# Patient Record
Sex: FEMALE | Race: OTHER | NOT HISPANIC OR LATINO
[De-identification: names, ages, dates, MRNs, and addresses within clinical notes are randomized per-mention and may not be internally consistent; named-entity substitution may affect disease eponyms.]

---

## 2019-01-27 ENCOUNTER — RECORD ABSTRACTING (OUTPATIENT)
Age: 59
End: 2019-01-27

## 2019-01-27 DIAGNOSIS — Z83.3 FAMILY HISTORY OF DIABETES MELLITUS: ICD-10-CM

## 2019-01-27 DIAGNOSIS — J98.01 ACUTE BRONCHOSPASM: ICD-10-CM

## 2019-01-27 DIAGNOSIS — Z78.9 OTHER SPECIFIED HEALTH STATUS: ICD-10-CM

## 2019-01-27 DIAGNOSIS — K21.9 GASTRO-ESOPHAGEAL REFLUX DISEASE W/OUT ESOPHAGITIS: ICD-10-CM

## 2019-01-27 DIAGNOSIS — Z87.19 PERSONAL HISTORY OF OTHER DISEASES OF THE DIGESTIVE SYSTEM: ICD-10-CM

## 2019-01-30 ENCOUNTER — APPOINTMENT (OUTPATIENT)
Dept: CARDIOLOGY | Facility: CLINIC | Age: 59
End: 2019-01-30
Payer: COMMERCIAL

## 2019-01-30 ENCOUNTER — NON-APPOINTMENT (OUTPATIENT)
Age: 59
End: 2019-01-30

## 2019-01-30 VITALS
HEART RATE: 92 BPM | DIASTOLIC BLOOD PRESSURE: 78 MMHG | WEIGHT: 135 LBS | HEIGHT: 60 IN | BODY MASS INDEX: 26.5 KG/M2 | SYSTOLIC BLOOD PRESSURE: 115 MMHG

## 2019-01-30 PROBLEM — Z87.19 HISTORY OF GASTROESOPHAGEAL REFLUX (GERD): Status: RESOLVED | Noted: 2019-01-30 | Resolved: 2019-01-30

## 2019-01-30 PROBLEM — Z78.9 NO FAMILY HISTORY OF CARDIOVASCULAR DISEASE: Status: ACTIVE | Noted: 2019-01-30

## 2019-01-30 PROBLEM — J98.01 BRONCHOSPASM: Status: RESOLVED | Noted: 2019-01-30 | Resolved: 2019-01-30

## 2019-01-30 PROCEDURE — 99244 OFF/OP CNSLTJ NEW/EST MOD 40: CPT

## 2019-01-30 PROCEDURE — 93000 ELECTROCARDIOGRAM COMPLETE: CPT

## 2019-01-30 NOTE — REASON FOR VISIT
[FreeTextEntry1] : Kenisha Vallecillo presents for cardiovascular consultation. She is seen at the patient and her primary care providers request.\par \par She is a 58-year-old female patient.\par \par Her primary care physician is Doctor Diaz in [place].\par \par Her problem list includes supraventricular tachycardia status post ablation in 2013, dyslipidemia, hyperglycemia.\par \par She has additional medical problems as noted.

## 2019-01-30 NOTE — HISTORY OF PRESENT ILLNESS
[FreeTextEntry1] : Since her last examination more than one year ago she reports no major events or hospitalizations. She has been active and exercising. 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 She reports she has had laboratories with her primary care physician. I reviewed the findings

## 2019-01-30 NOTE — PHYSICAL EXAM

## 2019-01-30 NOTE — DISCUSSION/SUMMARY
[FreeTextEntry1] : Supraventricular tachycardia.\par She remains with a durable control after ablation in 2013 by Doctor Yuliya.\par \par Dyslipidemia.\par Improving control with therapeutic blood cell treatment.\par \par Hyperglycemia.\par Improved control.\par \par Valve/echo findings. Insignificant\par \par Overweight.\par She is enthusiastic about working on her exercise and diet and I advised her.

## 2020-07-29 ENCOUNTER — APPOINTMENT (OUTPATIENT)
Dept: CARDIOLOGY | Facility: CLINIC | Age: 60
End: 2020-07-29
Payer: COMMERCIAL

## 2020-07-29 ENCOUNTER — NON-APPOINTMENT (OUTPATIENT)
Age: 60
End: 2020-07-29

## 2020-07-29 VITALS
DIASTOLIC BLOOD PRESSURE: 78 MMHG | BODY MASS INDEX: 27.29 KG/M2 | HEIGHT: 60 IN | SYSTOLIC BLOOD PRESSURE: 124 MMHG | WEIGHT: 139 LBS

## 2020-07-29 DIAGNOSIS — Z86.79 PERSONAL HISTORY OF OTHER DISEASES OF THE CIRCULATORY SYSTEM: ICD-10-CM

## 2020-07-29 PROCEDURE — 99214 OFFICE O/P EST MOD 30 MIN: CPT

## 2020-07-29 PROCEDURE — 93000 ELECTROCARDIOGRAM COMPLETE: CPT

## 2020-07-29 NOTE — HISTORY OF PRESENT ILLNESS
[FreeTextEntry1] : This 60 year-old female patient presents for cardiovascular evaluation.\par \par Her problem list is as noted above.\par \par Since her last examination in January 2019 she reports no major events or hospitalizations.  She has been active.  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  She has struggled with her weight a bit.  I encouraged her.\par \par There have been no acute symptoms of shortness of breath, chest discomfort, palpitation, lightheadedness.  Overall she is feeling well.\par \par She is seen at the patient and her primary care physician's request.

## 2020-07-29 NOTE — DISCUSSION/SUMMARY
[FreeTextEntry1] : Brief recommendations and follow-up: (see above for details)\par \par Patient's cardiovascular status is stable.\par Durable response to her ablation for paroxysmal supraventricular tachycardia.\par She needs attention to risk factor reduction, diet and exercise.  I recommended formal nutritional consultation.\par Next routine cardiology visit 1 year.

## 2020-07-29 NOTE — ASSESSMENT
[FreeTextEntry1] : EKG 7/29/2020.  Sinus rhythm.  Within normal limits.\par EKG 1/30/19. sinus rhythm. Within normal limits.

## 2020-07-29 NOTE — REASON FOR VISIT
[FreeTextEntry1] : Ms. DANIELLE LANDA has the following problem list:\par \par Supraventricular tachycardia/status post ablation 2013\par Dyslipidemia\par Hyperglycemia\par \par She has additional medical problems as noted\par \par Her primary care physician is Doctor Sergio Daiz in [place].\par

## 2021-04-28 ENCOUNTER — NON-APPOINTMENT (OUTPATIENT)
Age: 61
End: 2021-04-28

## 2021-04-28 ENCOUNTER — APPOINTMENT (OUTPATIENT)
Dept: INTERNAL MEDICINE | Facility: CLINIC | Age: 61
End: 2021-04-28
Payer: COMMERCIAL

## 2021-04-28 VITALS — SYSTOLIC BLOOD PRESSURE: 150 MMHG | DIASTOLIC BLOOD PRESSURE: 70 MMHG

## 2021-04-28 VITALS — BODY MASS INDEX: 25.13 KG/M2 | TEMPERATURE: 98 F | HEIGHT: 60 IN | WEIGHT: 128 LBS

## 2021-04-28 DIAGNOSIS — I47.1 SUPRAVENTRICULAR TACHYCARDIA: ICD-10-CM

## 2021-04-28 PROCEDURE — 99072 ADDL SUPL MATRL&STAF TM PHE: CPT

## 2021-04-28 PROCEDURE — 99203 OFFICE O/P NEW LOW 30 MIN: CPT

## 2021-04-28 NOTE — HISTORY OF PRESENT ILLNESS
[FreeTextEntry1] : establish care [de-identified] : Patient is a 60F with thyroid nodule, allergy rhinitis with bronchospasm (advair daily), SVT s/p ablation, right 5th digit degenerative cysts\par  (7/2020), pre-diabetes, HLD presents today to establish care\par Needs all new doctors due to insurance, was in the process of planning right thyroidectomy when drs dropped her insurance. \par \par \par

## 2021-04-28 NOTE — PHYSICAL EXAM
[Normal Sclera/Conjunctiva] : normal sclera/conjunctiva [Normal Outer Ear/Nose] : the outer ears and nose were normal in appearance [No Edema] : there was no peripheral edema [No Rash] : no rash [Normal] : affect was normal and insight and judgment were intact [de-identified] : right thyroid nodule

## 2021-04-28 NOTE — HEALTH RISK ASSESSMENT
[Never (0 pts)] : Never (0 points) [No] : In the past 12 months have you used drugs other than those required for medical reasons? No [No falls in past year] : Patient reported no falls in the past year [0] : 2) Feeling down, depressed, or hopeless: Not at all (0) [HIV Test offered] : HIV Test offered [Hepatitis C test offered] : Hepatitis C test offered [# of Members in Household ___] :  household currently consist of [unfilled] member(s) [Employed] : employed [Single] : single [# Of Children ___] : has [unfilled] children [Feels Safe at Home] : Feels safe at home [Reports normal functional visual acuity (ie: able to read med bottle)] : Reports normal functional visual acuity [Smoke Detector] : smoke detector [Carbon Monoxide Detector] : carbon monoxide detector [Seat Belt] :  uses seat belt [Sunscreen] : uses sunscreen [Patient/Caregiver not ready to engage] : Patient/Caregiver not ready to engage [] : No [de-identified] : walking  [de-identified] : well balanced  [[id number]] : 0 [Reports changes in hearing] : Reports no changes in hearing [Reports changes in vision] : Reports no changes in vision [Reports changes in dental health] : Reports no changes in dental health [Guns at Home] : no guns at home [Safety elements used in home] : no safety elements used in home [Travel to Developing Areas] : does not  travel to developing areas [Caregiver Concerns] : does not have caregiver concerns [MammogramDate] : 2017 [MammogramComments] : dorantes [PapSmearDate] : never [BoneDensityDate] : never [ColonoscopyDate] : never [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

## 2021-06-01 ENCOUNTER — APPOINTMENT (OUTPATIENT)
Dept: SURGERY | Facility: CLINIC | Age: 61
End: 2021-06-01
Payer: COMMERCIAL

## 2021-06-01 VITALS
DIASTOLIC BLOOD PRESSURE: 81 MMHG | SYSTOLIC BLOOD PRESSURE: 131 MMHG | WEIGHT: 132 LBS | HEIGHT: 59 IN | HEART RATE: 76 BPM | BODY MASS INDEX: 26.61 KG/M2

## 2021-06-01 PROCEDURE — 99072 ADDL SUPL MATRL&STAF TM PHE: CPT

## 2021-06-01 PROCEDURE — 99204 OFFICE O/P NEW MOD 45 MIN: CPT

## 2021-06-05 NOTE — HISTORY OF PRESENT ILLNESS
[de-identified] : Pt c/o Thyroid nodules.   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  denies dysphagia, hoarseness, SOB \par sonogram:  Right 3.2 cm nodule, FNA (CBL) nondiagnostic, Right 1.5 cm nodule, FNA  (CBL) FLUS,  HRAS positive\par TSH 2.39\par I have reviewed all old and new data and available images.\par

## 2021-06-05 NOTE — CONSULT LETTER
[Dear  ___] : Dear  [unfilled], [Consult Letter:] : I had the pleasure of evaluating your patient, [unfilled]. [Please see my note below.] : Please see my note below. [Consult Closing:] : Thank you very much for allowing me to participate in the care of this patient.  If you have any questions, please do not hesitate to contact me. [Sincerely,] : Sincerely, [FreeTextEntry2] : Dr. Trish Deutsch [FreeTextEntry3] : Moody Stringer MD, FACS\par System Director, Endocrine Surgery\par [facility]\par Assistant Clinical Professor of Surgery\par [facility] [employer] [facility]

## 2021-06-05 NOTE — ASSESSMENT
[FreeTextEntry1] : lengthy discussion regarding options for management including active surveillance  vs thyroid lobectomy with possible paratracheal node dissection, with or without frozen section vs total thyroidectomy with possible paratracheal node dissection.  risks, benefits and alternatives discussed at length.  I have discussed with the patient the anatomy of the area, the pathophysiology of the disease process and the rationale for surgery.  The attendant risks, possible complications and expected postoperative course have been discussed in detail.  I have given the patient the opportunity to ask questions, and all questions have been answered to the patient's satisfaction, and she wishes to proceed with lobectomy, possible total thyroidectomy. to be scheduled ambulatory at [facility]

## 2021-06-05 NOTE — PHYSICAL EXAM
[de-identified] : 2.5 cm right thyroid nodule, well circumscribed and mobile [Laryngoscopy Performed] : laryngoscopy was performed, see procedure section for findings [Midline] : located in midline position [Normal] : orientation to person, place, and time: normal [de-identified] : indirect  laryngoscopy shows normal vocal cord mobility bilaterally with no lesions noted

## 2021-06-10 ENCOUNTER — RESULT REVIEW (OUTPATIENT)
Age: 61
End: 2021-06-10

## 2021-07-20 ENCOUNTER — NON-APPOINTMENT (OUTPATIENT)
Age: 61
End: 2021-07-20

## 2021-07-21 ENCOUNTER — OUTPATIENT (OUTPATIENT)
Dept: OUTPATIENT SERVICES | Facility: HOSPITAL | Age: 61
LOS: 1 days | End: 2021-07-21

## 2021-07-21 ENCOUNTER — APPOINTMENT (OUTPATIENT)
Dept: CARDIOLOGY | Facility: CLINIC | Age: 61
End: 2021-07-21
Payer: COMMERCIAL

## 2021-07-21 ENCOUNTER — NON-APPOINTMENT (OUTPATIENT)
Age: 61
End: 2021-07-21

## 2021-07-21 VITALS
HEIGHT: 57 IN | DIASTOLIC BLOOD PRESSURE: 80 MMHG | HEART RATE: 68 BPM | WEIGHT: 130.07 LBS | RESPIRATION RATE: 18 BRPM | OXYGEN SATURATION: 98 % | TEMPERATURE: 97 F | SYSTOLIC BLOOD PRESSURE: 140 MMHG

## 2021-07-21 VITALS — HEART RATE: 86 BPM | SYSTOLIC BLOOD PRESSURE: 120 MMHG | DIASTOLIC BLOOD PRESSURE: 72 MMHG

## 2021-07-21 VITALS — WEIGHT: 130 LBS | HEIGHT: 59 IN | BODY MASS INDEX: 26.21 KG/M2

## 2021-07-21 DIAGNOSIS — Z98.890 OTHER SPECIFIED POSTPROCEDURAL STATES: Chronic | ICD-10-CM

## 2021-07-21 DIAGNOSIS — E04.1 NONTOXIC SINGLE THYROID NODULE: ICD-10-CM

## 2021-07-21 DIAGNOSIS — E04.2 NONTOXIC MULTINODULAR GOITER: ICD-10-CM

## 2021-07-21 LAB
A1C WITH ESTIMATED AVERAGE GLUCOSE RESULT: 5.5 % — SIGNIFICANT CHANGE UP (ref 4–5.6)
ALBUMIN SERPL ELPH-MCNC: 4.6 G/DL — SIGNIFICANT CHANGE UP (ref 3.3–5)
ALP SERPL-CCNC: 71 U/L — SIGNIFICANT CHANGE UP (ref 40–120)
ALT FLD-CCNC: 31 U/L — SIGNIFICANT CHANGE UP (ref 4–33)
ANION GAP SERPL CALC-SCNC: 14 MMOL/L — SIGNIFICANT CHANGE UP (ref 7–14)
AST SERPL-CCNC: 22 U/L — SIGNIFICANT CHANGE UP (ref 4–32)
BILIRUB SERPL-MCNC: 0.4 MG/DL — SIGNIFICANT CHANGE UP (ref 0.2–1.2)
BLD GP AB SCN SERPL QL: NEGATIVE — SIGNIFICANT CHANGE UP
BUN SERPL-MCNC: 16 MG/DL — SIGNIFICANT CHANGE UP (ref 7–23)
CALCIUM SERPL-MCNC: 9.6 MG/DL — SIGNIFICANT CHANGE UP (ref 8.4–10.5)
CHLORIDE SERPL-SCNC: 105 MMOL/L — SIGNIFICANT CHANGE UP (ref 98–107)
CO2 SERPL-SCNC: 23 MMOL/L — SIGNIFICANT CHANGE UP (ref 22–31)
CREAT SERPL-MCNC: 0.69 MG/DL — SIGNIFICANT CHANGE UP (ref 0.5–1.3)
ESTIMATED AVERAGE GLUCOSE: 111 — SIGNIFICANT CHANGE UP
GLUCOSE SERPL-MCNC: 77 MG/DL — SIGNIFICANT CHANGE UP (ref 70–99)
HCT VFR BLD CALC: 44.7 % — SIGNIFICANT CHANGE UP (ref 34.5–45)
HGB BLD-MCNC: 14.1 G/DL — SIGNIFICANT CHANGE UP (ref 11.5–15.5)
MCHC RBC-ENTMCNC: 28.6 PG — SIGNIFICANT CHANGE UP (ref 27–34)
MCHC RBC-ENTMCNC: 31.5 GM/DL — LOW (ref 32–36)
MCV RBC AUTO: 90.7 FL — SIGNIFICANT CHANGE UP (ref 80–100)
NRBC # BLD: 0 /100 WBCS — SIGNIFICANT CHANGE UP
NRBC # FLD: 0 K/UL — SIGNIFICANT CHANGE UP
PLATELET # BLD AUTO: 187 K/UL — SIGNIFICANT CHANGE UP (ref 150–400)
POTASSIUM SERPL-MCNC: 4.1 MMOL/L — SIGNIFICANT CHANGE UP (ref 3.5–5.3)
POTASSIUM SERPL-SCNC: 4.1 MMOL/L — SIGNIFICANT CHANGE UP (ref 3.5–5.3)
PROT SERPL-MCNC: 7.9 G/DL — SIGNIFICANT CHANGE UP (ref 6–8.3)
RBC # BLD: 4.93 M/UL — SIGNIFICANT CHANGE UP (ref 3.8–5.2)
RBC # FLD: 13 % — SIGNIFICANT CHANGE UP (ref 10.3–14.5)
RH IG SCN BLD-IMP: POSITIVE — SIGNIFICANT CHANGE UP
SODIUM SERPL-SCNC: 142 MMOL/L — SIGNIFICANT CHANGE UP (ref 135–145)
WBC # BLD: 4.19 K/UL — SIGNIFICANT CHANGE UP (ref 3.8–10.5)
WBC # FLD AUTO: 4.19 K/UL — SIGNIFICANT CHANGE UP (ref 3.8–10.5)

## 2021-07-21 PROCEDURE — 93000 ELECTROCARDIOGRAM COMPLETE: CPT | Mod: NC

## 2021-07-21 PROCEDURE — 99213 OFFICE O/P EST LOW 20 MIN: CPT

## 2021-07-21 PROCEDURE — 99072 ADDL SUPL MATRL&STAF TM PHE: CPT

## 2021-07-21 RX ORDER — SODIUM CHLORIDE 9 MG/ML
1000 INJECTION, SOLUTION INTRAVENOUS
Refills: 0 | Status: DISCONTINUED | OUTPATIENT
Start: 2021-08-02 | End: 2021-08-16

## 2021-07-21 NOTE — DISCUSSION/SUMMARY
[FreeTextEntry1] : Brief recommendations and follow-up: (see above for details)\par \par The patient is an appropriate candidate for her upcoming thyroid surgery as noted.\par Continue current routine.  No special intervention required.\par We will move back her August appointment 1 year from now.\par I advised routine laboratories including a lipid profile with her primary care physician.

## 2021-07-21 NOTE — CARDIOLOGY SUMMARY
[de-identified] : Holter 05/2012 sinus rhythm. Very rare APC.\par  [de-identified] : Stress Test 03/2013 no ischemia. 7 minutes. Anshul stage III. 8.1 METs. 97%. Diastolic  hypertension.\par  [de-identified] : Echo 02/2013 normal LV systolic and diastolic function, wall thickness, and chamber  size. EF 75%. Trivial anterior/posterior effusion. Normal valves. Trivial MR/TR.\par

## 2021-07-21 NOTE — HISTORY OF PRESENT ILLNESS
[FreeTextEntry1] : This 61 year-old female patient presents for cardiovascular evaluation.\par \par Her problem list is as noted above.\par \par Preoperative cardiovascular consultation.  She has been diagnosed with a right thyroid nodule.  Notes are reviewed.  She is anticipating right lobectomy and possible total thyroidectomy.\par \par Since her last evaluation approximately 1 year ago she reports no other major events or hospitalizations.  She is active and exercising.  There have been no acute symptoms of shortness of breath, chest discomfort, palpitation, lightheadedness, fainting.\par \par She has a new primary care physician as noted.\par \par She is seen at the primary care physician, and surgeon's request for preoperative consultation.\par \par

## 2021-07-21 NOTE — REASON FOR VISIT
[FreeTextEntry1] : Ms. DANIELLE LANDA has the following problem list:\par \par Supraventricular tachycardia/status post ablation 2013\par Dyslipidemia\par Prediabetes\par \par She has additional medical problems as noted\par \par Her primary care physician is Doctor Deutsch.\par

## 2021-07-21 NOTE — H&P PST ADULT - NSICDXFAMILYHX_GEN_ALL_CORE_FT
FAMILY HISTORY:  Father  Still living? No  FH: diabetes mellitus, Age at diagnosis: Age Unknown    Mother  Still living? No  FH: diabetes mellitus, Age at diagnosis: Age Unknown  FH: kidney disease, Age at diagnosis: Age Unknown    Sibling  Still living? Yes, Estimated age: Age Unknown  FH: diabetes mellitus, Age at diagnosis: Age Unknown

## 2021-07-21 NOTE — H&P PST ADULT - NSICDXPASTMEDICALHX_GEN_ALL_CORE_FT
PAST MEDICAL HISTORY:  Bronchospasm     Seasonal allergies     SVT (supraventricular tachycardia)      PAST MEDICAL HISTORY:  Bronchospasm     Multiple thyroid nodules     Obese     Seasonal allergies     SVT (supraventricular tachycardia)

## 2021-07-21 NOTE — ASSESSMENT
[FreeTextEntry1] : EKG 7/21/2021.  Sinus rhythm, within normal limits.\par EKG 7/29/2020.  Sinus rhythm.  Within normal limits.\par EKG 1/30/19. sinus rhythm. Within normal limits.

## 2021-07-21 NOTE — REVIEW OF SYSTEMS
[Negative] : Heme/Lymph [FreeTextEntry4] : Thyroid nodule, anticipating surgery. [FreeTextEntry5] : See HPI. [FreeTextEntry6] : History of asthma. [FreeTextEntry9] : Relatively mild arthritis. [de-identified] : Removal of mucoid cyst from right 5th digit.

## 2021-07-21 NOTE — H&P PST ADULT - NSICDXPASTSURGICALHX_GEN_ALL_CORE_FT
PAST SURGICAL HISTORY:  H/O excision of ganglion cyst right fifth finger-8/1990    H/O hemorrhoidectomy 1990    History of prior ablation treatment 1990's for SVT    History of pterygium excision right-1987

## 2021-07-21 NOTE — H&P PST ADULT - NSICDXPROBLEM_GEN_ALL_CORE_FT
PROBLEM DIAGNOSES  Problem: Multiple thyroid nodules  Assessment and Plan: Pt. is scheduled for a right thyroid lobectomy...possible paratracheal node dissection 8/2/21.  Pt. verbalized understanding of instructions and that Chlorhexidine is for external use.  Pt. had cardiac clearance earlier today, 7/21/21.

## 2021-07-26 PROBLEM — E04.2 NONTOXIC MULTINODULAR GOITER: Chronic | Status: ACTIVE | Noted: 2021-07-21

## 2021-07-26 PROBLEM — J98.01 ACUTE BRONCHOSPASM: Chronic | Status: ACTIVE | Noted: 2021-07-21

## 2021-07-26 PROBLEM — I47.1 SUPRAVENTRICULAR TACHYCARDIA: Chronic | Status: ACTIVE | Noted: 2021-07-21

## 2021-07-26 PROBLEM — J30.2 OTHER SEASONAL ALLERGIC RHINITIS: Chronic | Status: ACTIVE | Noted: 2021-07-21

## 2021-07-26 PROBLEM — E66.9 OBESITY, UNSPECIFIED: Chronic | Status: ACTIVE | Noted: 2021-07-21

## 2021-07-30 NOTE — ASU PATIENT PROFILE, ADULT - PMH
Bronchospasm    Multiple thyroid nodules    Obese    Seasonal allergies    SVT (supraventricular tachycardia)

## 2021-07-30 NOTE — ASU PATIENT PROFILE, ADULT - PSH
H/O excision of ganglion cyst  right fifth finger-8/1990  H/O hemorrhoidectomy  1990  History of prior ablation treatment  1990's for SVT  History of pterygium excision  right-1987

## 2021-08-01 ENCOUNTER — TRANSCRIPTION ENCOUNTER (OUTPATIENT)
Age: 61
End: 2021-08-01

## 2021-08-02 ENCOUNTER — OUTPATIENT (OUTPATIENT)
Dept: OUTPATIENT SERVICES | Facility: HOSPITAL | Age: 61
LOS: 1 days | Discharge: HOME | End: 2021-08-02
Payer: COMMERCIAL

## 2021-08-02 ENCOUNTER — APPOINTMENT (OUTPATIENT)
Dept: SURGERY | Facility: HOSPITAL | Age: 61
End: 2021-08-02

## 2021-08-02 ENCOUNTER — RESULT REVIEW (OUTPATIENT)
Age: 61
End: 2021-08-02

## 2021-08-02 VITALS
TEMPERATURE: 98 F | HEIGHT: 57 IN | OXYGEN SATURATION: 100 % | HEART RATE: 85 BPM | DIASTOLIC BLOOD PRESSURE: 82 MMHG | RESPIRATION RATE: 16 BRPM | SYSTOLIC BLOOD PRESSURE: 129 MMHG | WEIGHT: 130.07 LBS

## 2021-08-02 VITALS
RESPIRATION RATE: 17 BRPM | HEART RATE: 104 BPM | SYSTOLIC BLOOD PRESSURE: 123 MMHG | OXYGEN SATURATION: 97 % | DIASTOLIC BLOOD PRESSURE: 78 MMHG

## 2021-08-02 DIAGNOSIS — Z98.890 OTHER SPECIFIED POSTPROCEDURAL STATES: Chronic | ICD-10-CM

## 2021-08-02 DIAGNOSIS — E04.1 NONTOXIC SINGLE THYROID NODULE: ICD-10-CM

## 2021-08-02 PROCEDURE — 60252 REMOVAL OF THYROID: CPT

## 2021-08-02 PROCEDURE — 13132 CMPLX RPR F/C/C/M/N/AX/G/H/F: CPT | Mod: 59

## 2021-08-02 PROCEDURE — 88307 TISSUE EXAM BY PATHOLOGIST: CPT | Mod: 26

## 2021-08-02 RX ORDER — FLUTICASONE PROPIONATE 50 MCG
1 SPRAY, SUSPENSION NASAL
Qty: 0 | Refills: 0 | DISCHARGE

## 2021-08-02 RX ORDER — OXYCODONE HYDROCHLORIDE 5 MG/1
5 TABLET ORAL ONCE
Refills: 0 | Status: DISCONTINUED | OUTPATIENT
Start: 2021-08-02 | End: 2021-08-02

## 2021-08-02 RX ORDER — ACETAMINOPHEN 500 MG
650 TABLET ORAL EVERY 6 HOURS
Refills: 0 | Status: DISCONTINUED | OUTPATIENT
Start: 2021-08-02 | End: 2021-08-16

## 2021-08-02 RX ORDER — FENTANYL CITRATE 50 UG/ML
25 INJECTION INTRAVENOUS
Refills: 0 | Status: DISCONTINUED | OUTPATIENT
Start: 2021-08-02 | End: 2021-08-02

## 2021-08-02 RX ORDER — OXYCODONE AND ACETAMINOPHEN 5; 325 MG/1; MG/1
1 TABLET ORAL EVERY 6 HOURS
Refills: 0 | Status: DISCONTINUED | OUTPATIENT
Start: 2021-08-02 | End: 2021-08-02

## 2021-08-02 RX ORDER — BENZOCAINE AND MENTHOL 5; 1 G/100ML; G/100ML
1 LIQUID ORAL
Refills: 0 | Status: DISCONTINUED | OUTPATIENT
Start: 2021-08-02 | End: 2021-08-16

## 2021-08-02 RX ORDER — AZELASTINE 137 UG/1
2 SPRAY, METERED NASAL
Qty: 0 | Refills: 0 | DISCHARGE

## 2021-08-02 RX ORDER — FAMOTIDINE 10 MG/ML
1 INJECTION INTRAVENOUS
Qty: 0 | Refills: 0 | DISCHARGE

## 2021-08-02 RX ORDER — FLUTICASONE PROPIONATE AND SALMETEROL 50; 250 UG/1; UG/1
1 POWDER ORAL; RESPIRATORY (INHALATION)
Qty: 0 | Refills: 0 | DISCHARGE

## 2021-08-02 RX ORDER — ONDANSETRON 8 MG/1
4 TABLET, FILM COATED ORAL ONCE
Refills: 0 | Status: DISCONTINUED | OUTPATIENT
Start: 2021-08-02 | End: 2021-08-16

## 2021-08-02 RX ORDER — ACETAMINOPHEN 500 MG
2 TABLET ORAL
Qty: 0 | Refills: 0 | DISCHARGE
Start: 2021-08-02

## 2021-08-02 RX ADMIN — SODIUM CHLORIDE 50 MILLILITER(S): 9 INJECTION, SOLUTION INTRAVENOUS at 09:00

## 2021-08-02 NOTE — ASU DISCHARGE PLAN (ADULT/PEDIATRIC) - DIET/FLUID RESTRICTION
Progress slowly
Abdomen soft, non-tender and non-distended, no rebound, no guarding and no masses. no hepatosplenomegaly.

## 2021-08-02 NOTE — ASU DISCHARGE PLAN (ADULT/PEDIATRIC) - NURSING INSTRUCTIONS
You were given intravenous TYLENOL for pain management at  8am. Please DO NOT take any products containing TYLENOL or ACETAMINOPHEN, such as VICODIN, PERCOCET, EXCEDRIN, and any over-the-counter cold medication for the next 6 hours (until _2pm). DO NOT TAKE MORE THAN 3000 MG OF TYLENOL in a 24 hour period.

## 2021-08-02 NOTE — ASU DISCHARGE PLAN (ADULT/PEDIATRIC) - CARE PROVIDER_API CALL
Moody Stringer)  Plastic Surgery  [address] [place] [address] [address]  [place] [address]  Phone: [phone]  Fax: [phone]  Follow Up Time:

## 2021-08-02 NOTE — BRIEF OPERATIVE NOTE - OPERATION/FINDINGS
Right partial thyroidectomy of lobule was excised with isthmusectomy. Remaining isthmus/thyroid oversewn and hemostasis achieved with ties, clips, and electrocautery. Inferior paratracheal lymph nodes isolated and excised with no intraoperative signs of disease. Drain sutured in place and strap muscles, platysma, and overlying skin reapproximated.

## 2021-08-02 NOTE — BRIEF OPERATIVE NOTE - NSICDXBRIEFPROCEDURE_GEN_ALL_CORE_FT
PROCEDURES:  Partial thyroidectomy with isthmusectomy if indicated 02-Aug-2021 09:08:04  Juan Carlos Woodward

## 2021-08-03 ENCOUNTER — APPOINTMENT (OUTPATIENT)
Dept: SURGERY | Facility: CLINIC | Age: 61
End: 2021-08-03
Payer: COMMERCIAL

## 2021-08-03 PROCEDURE — 99024 POSTOP FOLLOW-UP VISIT: CPT

## 2021-08-03 NOTE — ASSESSMENT
[FreeTextEntry1] : drain removed. instructed in maneuvers to minimize scarring. to call next week for final pathology report.  bloods next visit. to return earlier if any change.  patient has been given the opportunity to ask questions, and all of the patient's questions have been answered to their satisfaction\par

## 2021-08-03 NOTE — HISTORY OF PRESENT ILLNESS
SEBORRHEIC KERATOSES    Seborrheic keratoses are very common harmless, often pigmented, growths on the skin. They are due to a build up of ordinary skin cells. They can itch, become inflamed, and catch on clothing. They are not infectious; they can never become a cancer. Treatment can occur by either freezing them with liquid nitrogen (cryotherapy), or scraping them off (curettage). This is not a procedure that is typically covered by insurance but can be done cosmetically if desired. Sun Protection    Ultraviolet light from the sun is responsible for inducing sunburn, photoaging (wrinkles, discoloration), and skin cancer. Photoprotection is crucial to prevent or reduce the potential harms associated with UV exposure. All individuals, regardless of skin color are subject to the effects of the sun. 1) Minimize sun exposure, especially during the middle of the day (between 10 am and 3 pm) when the UV intensity is greatest.   2) Wear sun-protective clothing when outside:  A hat, long sleeved shirt, long pants and sunglasses. Clothing with ultraviolet protection factor (UPF) is another way to prevent sun damage. 3) Liberally apply SPF 30 sunscreen or higher, use a broad spectrum sunscreen that covers both UVA and UVB. Reapply sunscreen every 2 hours, or after sweating or swimming. Heliocare is an oral antioxidant supplement that helps to prevent sunburn, it should be used with sunscreen. [email or web address]    Perform skin self exams, return to the dermatology office if you notice anything new or changing on your skin. For more information visit: [email or web address] [de-identified] : 1 day s/p thyroid lobectomy.  denies dysphagia, hoarseness. no changes medically since last visit. I have reviewed all old and new data and available images.\par

## 2021-08-03 NOTE — PHYSICAL EXAM
[de-identified] : minimal operative site swelling.  drain in place [de-identified] : no palpable thyroid nodules [Midline] : located in midline position [Normal] : orientation to person, place, and time: normal

## 2021-08-06 LAB — SURGICAL PATHOLOGY STUDY: SIGNIFICANT CHANGE UP

## 2021-08-12 ENCOUNTER — NON-APPOINTMENT (OUTPATIENT)
Age: 61
End: 2021-08-12

## 2021-09-02 ENCOUNTER — APPOINTMENT (OUTPATIENT)
Dept: SURGERY | Facility: CLINIC | Age: 61
End: 2021-09-02
Payer: COMMERCIAL

## 2021-09-02 PROCEDURE — 36415 COLL VENOUS BLD VENIPUNCTURE: CPT

## 2021-09-02 PROCEDURE — 99024 POSTOP FOLLOW-UP VISIT: CPT

## 2021-09-02 NOTE — PHYSICAL EXAM
[de-identified] : mild swelling well healed incision [Midline] : located in midline position [Normal] : orientation to person, place, and time: normal

## 2021-09-07 ENCOUNTER — NON-APPOINTMENT (OUTPATIENT)
Age: 61
End: 2021-09-07

## 2021-09-07 LAB
T3 SERPL-MCNC: 113 NG/DL
T4 FREE SERPL-MCNC: 1.2 NG/DL
THYROGLOB AB SERPL-ACNC: <20 IU/ML
THYROPEROXIDASE AB SERPL IA-ACNC: 165 IU/ML
TSH SERPL-ACNC: 3.83 UIU/ML

## 2021-09-11 ENCOUNTER — RESULT REVIEW (OUTPATIENT)
Age: 61
End: 2021-09-11

## 2021-09-14 ENCOUNTER — NON-APPOINTMENT (OUTPATIENT)
Age: 61
End: 2021-09-14

## 2021-10-12 ENCOUNTER — RESULT REVIEW (OUTPATIENT)
Age: 61
End: 2021-10-12

## 2021-10-16 ENCOUNTER — APPOINTMENT (OUTPATIENT)
Dept: INTERNAL MEDICINE | Facility: CLINIC | Age: 61
End: 2021-10-16
Payer: COMMERCIAL

## 2021-10-16 VITALS
HEIGHT: 59 IN | SYSTOLIC BLOOD PRESSURE: 130 MMHG | DIASTOLIC BLOOD PRESSURE: 80 MMHG | HEART RATE: 77 BPM | WEIGHT: 135 LBS | BODY MASS INDEX: 27.21 KG/M2

## 2021-10-16 DIAGNOSIS — Z12.11 ENCOUNTER FOR SCREENING FOR MALIGNANT NEOPLASM OF COLON: ICD-10-CM

## 2021-10-16 DIAGNOSIS — R92.1 MAMMOGRAPHIC CALCIFICATION FOUND ON DIAGNOSTIC IMAGING OF BREAST: ICD-10-CM

## 2021-10-16 PROCEDURE — 99214 OFFICE O/P EST MOD 30 MIN: CPT

## 2021-10-16 NOTE — HEALTH RISK ASSESSMENT
[No] : In the past 12 months have you used drugs other than those required for medical reasons? No [No falls in past year] : Patient reported no falls in the past year [0] : 2) Feeling down, depressed, or hopeless: Not at all (0) [] : No [de-identified] : walking [de-identified] : portion control

## 2022-01-06 ENCOUNTER — APPOINTMENT (OUTPATIENT)
Dept: SURGERY | Facility: CLINIC | Age: 62
End: 2022-01-06
Payer: COMMERCIAL

## 2022-01-06 PROCEDURE — 36415 COLL VENOUS BLD VENIPUNCTURE: CPT

## 2022-01-06 PROCEDURE — 99213 OFFICE O/P EST LOW 20 MIN: CPT | Mod: 25

## 2022-01-06 NOTE — ASSESSMENT
[FreeTextEntry1] : s/p thyroid lobectomy\par daily care\par blood work\par sono next visit\par f/u 6 months

## 2022-01-06 NOTE — HISTORY OF PRESENT ILLNESS
[de-identified] : Pt 6 months s/p Right thyroid lobectomy for benign disease doing well without complaints

## 2022-01-06 NOTE — PHYSICAL EXAM
[de-identified] : well healed scar [Midline] : located in midline position [Normal] : orientation to person, place, and time: normal

## 2022-01-07 ENCOUNTER — NON-APPOINTMENT (OUTPATIENT)
Age: 62
End: 2022-01-07

## 2022-01-07 LAB
T3 SERPL-MCNC: 119 NG/DL
T4 FREE SERPL-MCNC: 1.2 NG/DL
THYROGLOB AB SERPL-ACNC: <20 IU/ML
THYROPEROXIDASE AB SERPL IA-ACNC: 125 IU/ML
TSH SERPL-ACNC: 3.8 UIU/ML

## 2022-01-14 ENCOUNTER — NON-APPOINTMENT (OUTPATIENT)
Age: 62
End: 2022-01-14

## 2022-01-20 ENCOUNTER — NON-APPOINTMENT (OUTPATIENT)
Age: 62
End: 2022-01-20

## 2022-02-08 ENCOUNTER — APPOINTMENT (OUTPATIENT)
Dept: GASTROENTEROLOGY | Facility: CLINIC | Age: 62
End: 2022-02-08
Payer: COMMERCIAL

## 2022-02-08 ENCOUNTER — NON-APPOINTMENT (OUTPATIENT)
Age: 62
End: 2022-02-08

## 2022-02-08 VITALS
BODY MASS INDEX: 29.12 KG/M2 | WEIGHT: 135 LBS | TEMPERATURE: 98.7 F | OXYGEN SATURATION: 98 % | HEART RATE: 100 BPM | SYSTOLIC BLOOD PRESSURE: 122 MMHG | DIASTOLIC BLOOD PRESSURE: 70 MMHG | HEIGHT: 57 IN

## 2022-02-08 DIAGNOSIS — R19.5 OTHER FECAL ABNORMALITIES: ICD-10-CM

## 2022-02-08 PROCEDURE — 99203 OFFICE O/P NEW LOW 30 MIN: CPT

## 2022-02-08 NOTE — HISTORY OF PRESENT ILLNESS
[FreeTextEntry1] : 61 year old F thyroid nodule, right thyroidectomy, allergic rhinitis, bronchospasm, SVT, s/p ablation 2013, predm, HLD,  hemorrhoidectomy 1996, ganglion cyst, \par seen at the request Dr. Trish Deutsch for the evaluation of positive cologuard in 11/2021\par \par \par No prior colonoscopy\par \par \par Patient denies abdominal pain , n/v/heartburn, reflux, dysphagia/odynophagia, change in bowel habits, diarrhea, constipation, brbpr, melena. no weight loss.  Good appetite and energy level. Patient has daily BM, denies regular NSAID use. 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 \par \par fam hx negative for colon polyps, colon cancer\par \par cousins, lymphoma, breast cancers, liver cancer\par

## 2022-02-08 NOTE — ASSESSMENT
[FreeTextEntry1] : Screening colonoscopy\par \par Risks (including but not limited to sedation risks, infection, bleeding, perforation, possibility of missed lesions), benefits and alternatives to procedure, including not doing the procedure, were discussed with patient. Patient understood and agreed to proceed with colonoscopy. \par Colonoscopy preparation instructions reviewed with patient.\par \par 2 Dulcolax two days prior to procedure + Split MiraLAX/Dulcolax preparation \par \par no holds \par \par

## 2022-04-16 ENCOUNTER — RESULT REVIEW (OUTPATIENT)
Age: 62
End: 2022-04-16

## 2022-04-18 ENCOUNTER — RESULT REVIEW (OUTPATIENT)
Age: 62
End: 2022-04-18

## 2022-04-19 ENCOUNTER — APPOINTMENT (OUTPATIENT)
Dept: GASTROENTEROLOGY | Facility: HOSPITAL | Age: 62
End: 2022-04-19
Payer: COMMERCIAL

## 2022-04-19 PROCEDURE — 45382 COLONOSCOPY W/CONTROL BLEED: CPT | Mod: 59

## 2022-04-19 PROCEDURE — 45385 COLONOSCOPY W/LESION REMOVAL: CPT

## 2022-04-19 PROCEDURE — 45390 COLONOSCOPY W/RESECTION: CPT | Mod: 59

## 2022-06-06 ENCOUNTER — RESULT REVIEW (OUTPATIENT)
Age: 62
End: 2022-06-06

## 2022-06-07 ENCOUNTER — NON-APPOINTMENT (OUTPATIENT)
Age: 62
End: 2022-06-07

## 2022-06-18 ENCOUNTER — APPOINTMENT (OUTPATIENT)
Dept: INTERNAL MEDICINE | Facility: CLINIC | Age: 62
End: 2022-06-18
Payer: COMMERCIAL

## 2022-06-18 VITALS
WEIGHT: 135 LBS | TEMPERATURE: 98 F | SYSTOLIC BLOOD PRESSURE: 130 MMHG | BODY MASS INDEX: 29.12 KG/M2 | HEIGHT: 57 IN | DIASTOLIC BLOOD PRESSURE: 80 MMHG

## 2022-06-18 DIAGNOSIS — J30.9 ALLERGIC RHINITIS, UNSPECIFIED: ICD-10-CM

## 2022-06-18 PROCEDURE — 99214 OFFICE O/P EST MOD 30 MIN: CPT

## 2022-06-19 PROBLEM — J30.9 ALLERGIC RHINITIS: Status: ACTIVE | Noted: 2022-06-18

## 2022-06-19 LAB
25(OH)D3 SERPL-MCNC: 21.2 NG/ML
ALBUMIN SERPL ELPH-MCNC: 4.6 G/DL
ALP BLD-CCNC: 76 U/L
ALT SERPL-CCNC: 20 U/L
ANION GAP SERPL CALC-SCNC: 13 MMOL/L
AST SERPL-CCNC: 21 U/L
BASOPHILS # BLD AUTO: 0.02 K/UL
BASOPHILS NFR BLD AUTO: 0.6 %
BILIRUB SERPL-MCNC: 0.5 MG/DL
BUN SERPL-MCNC: 15 MG/DL
CALCIUM SERPL-MCNC: 9.2 MG/DL
CHLORIDE SERPL-SCNC: 106 MMOL/L
CHOLEST SERPL-MCNC: 213 MG/DL
CO2 SERPL-SCNC: 21 MMOL/L
CREAT SERPL-MCNC: 0.65 MG/DL
EGFR: 100 ML/MIN/1.73M2
EOSINOPHIL # BLD AUTO: 0.15 K/UL
EOSINOPHIL NFR BLD AUTO: 4.3 %
ESTIMATED AVERAGE GLUCOSE: 120 MG/DL
GLUCOSE SERPL-MCNC: 94 MG/DL
HBA1C MFR BLD HPLC: 5.8 %
HCT VFR BLD CALC: 43.2 %
HDLC SERPL-MCNC: 74 MG/DL
HGB BLD-MCNC: 14.2 G/DL
IMM GRANULOCYTES NFR BLD AUTO: 0 %
LDLC SERPL CALC-MCNC: 121 MG/DL
LYMPHOCYTES # BLD AUTO: 1.49 K/UL
LYMPHOCYTES NFR BLD AUTO: 42.3 %
MAN DIFF?: NORMAL
MCHC RBC-ENTMCNC: 28.9 PG
MCHC RBC-ENTMCNC: 32.9 GM/DL
MCV RBC AUTO: 87.8 FL
MONOCYTES # BLD AUTO: 0.21 K/UL
MONOCYTES NFR BLD AUTO: 6 %
NEUTROPHILS # BLD AUTO: 1.65 K/UL
NEUTROPHILS NFR BLD AUTO: 46.8 %
NONHDLC SERPL-MCNC: 139 MG/DL
PLATELET # BLD AUTO: 189 K/UL
POTASSIUM SERPL-SCNC: 3.9 MMOL/L
PROT SERPL-MCNC: 6.9 G/DL
RBC # BLD: 4.92 M/UL
RBC # FLD: 13.3 %
SODIUM SERPL-SCNC: 140 MMOL/L
T4 FREE SERPL-MCNC: 1.2 NG/DL
TRIGL SERPL-MCNC: 88 MG/DL
TSH SERPL-ACNC: 3.23 UIU/ML
WBC # FLD AUTO: 3.52 K/UL

## 2022-06-19 NOTE — HEALTH RISK ASSESSMENT
[No] : In the past 12 months have you used drugs other than those required for medical reasons? No [No falls in past year] : Patient reported no falls in the past year [0] : 2) Feeling down, depressed, or hopeless: Not at all (0) [Never] : Never [PHQ-2 Negative - No further assessment needed] : PHQ-2 Negative - No further assessment needed [Patient/Caregiver not ready to engage] : , patient/caregiver not ready to engage [de-identified] : walking [de-identified] : portion control [[id number]] : 0 [AdvancecareDate] : 6/2022

## 2022-06-19 NOTE — HISTORY OF PRESENT ILLNESS
[de-identified] : Patient is a 62F with thyroid nodule, allergy rhinitis with bronchospasm (advair daily), SVT s/p ablation, right 5th digit degenerative cysts\par  (7/2020), pre-diabetes, HLD presents today in follow up for these issues\par \par s/p right thyroidectomy with Dr. Stringer, doing well. Recent ultrasound done 6/6/22, reviewed with Kenisha\par \par \par Dr. Read (eye) seen 8/21 slight cataracts\par

## 2022-07-07 ENCOUNTER — APPOINTMENT (OUTPATIENT)
Dept: SURGERY | Facility: CLINIC | Age: 62
End: 2022-07-07

## 2022-07-07 LAB
THYROGLOB AB SERPL-ACNC: <20 IU/ML
THYROPEROXIDASE AB SERPL IA-ACNC: 113 IU/ML

## 2022-07-07 PROCEDURE — 99213 OFFICE O/P EST LOW 20 MIN: CPT

## 2022-07-07 NOTE — PHYSICAL EXAM
[de-identified] : well healed scar [Midline] : located in midline position [Normal] : orientation to person, place, and time: normal

## 2022-07-07 NOTE — HISTORY OF PRESENT ILLNESS
[de-identified] : Pt 1 year s/p thyroid lobectomy for NIFTP doing well without complaints had sonogram and blood work from PCP all reports reviewed

## 2022-08-02 ENCOUNTER — NON-APPOINTMENT (OUTPATIENT)
Age: 62
End: 2022-08-02

## 2022-08-03 ENCOUNTER — APPOINTMENT (OUTPATIENT)
Dept: CARDIOLOGY | Facility: CLINIC | Age: 62
End: 2022-08-03

## 2022-08-03 ENCOUNTER — NON-APPOINTMENT (OUTPATIENT)
Age: 62
End: 2022-08-03

## 2022-08-03 VITALS
TEMPERATURE: 98.7 F | HEIGHT: 55 IN | DIASTOLIC BLOOD PRESSURE: 88 MMHG | SYSTOLIC BLOOD PRESSURE: 126 MMHG | WEIGHT: 134 LBS | BODY MASS INDEX: 31.01 KG/M2 | RESPIRATION RATE: 14 BRPM | HEART RATE: 84 BPM | OXYGEN SATURATION: 96 %

## 2022-08-03 DIAGNOSIS — Z01.810 ENCOUNTER FOR PREPROCEDURAL CARDIOVASCULAR EXAMINATION: ICD-10-CM

## 2022-08-03 PROCEDURE — 99214 OFFICE O/P EST MOD 30 MIN: CPT | Mod: 25

## 2022-08-03 PROCEDURE — 93000 ELECTROCARDIOGRAM COMPLETE: CPT

## 2022-08-03 NOTE — HISTORY OF PRESENT ILLNESS
[FreeTextEntry1] : This 62 year-old female patient presents for cardiovascular evaluation.\par \par Her problem list is as noted above.\par \par Since her last examination 1 year ago she reports some medical interventions.  She had a partial thyroidectomy.  She reports benign findings.\par \par She also reports she had a colonoscopy with a polypectomy.  She is anticipating follow-up.\par \par She is active and exercising.  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  She walks the dog regularly.  Not formal exercise however.\par \par

## 2022-08-03 NOTE — ASSESSMENT
[FreeTextEntry1] : EKG 8/3/2022.  Sinus rhythm, within normal limits.\par EKG 7/21/2021.  Sinus rhythm, within normal limits.\par EKG 7/29/2020.  Sinus rhythm.  Within normal limits.\par EKG 1/30/19. sinus rhythm. Within normal limits.

## 2022-08-03 NOTE — REASON FOR VISIT
[FreeTextEntry1] : Ms. DANIELLE LANDAALLA.,  has the following problem list:\par \par Supraventricular tachycardia/status post ablation 2013\par Dyslipidemia\par Prediabetes\par \par She has additional medical problems as noted\par \par Her primary care physician is Doctor Deutsch.\par

## 2022-08-03 NOTE — DISCUSSION/SUMMARY
[FreeTextEntry1] : Brief recommendations and follow-up: (see above for details)\par \par The patient's overall cardiovascular status is stable.\par Good control of her arrhythmia after ablation in 2013.\par As noted, therapeutic lifestyle treatment advised for her weight control, prediabetes, and mildly elevated blood pressure noted today.\par I advised primary care physician evaluation with laboratories before the year is out.\par Next routine cardiology visit 1 year.

## 2022-08-03 NOTE — CARDIOLOGY SUMMARY
[de-identified] : Holter 05/2012 sinus rhythm. Very rare APC.\par  [de-identified] : Stress Test 03/2013 no ischemia. 7 minutes. Anshul stage III. 8.1 METs. 97%. Diastolic  hypertension.\par  [de-identified] : Echo 02/2013 normal LV systolic and diastolic function, wall thickness, and chamber  size. EF 75%. Trivial anterior/posterior effusion. Normal valves. Trivial MR/TR.\par

## 2022-08-03 NOTE — REVIEW OF SYSTEMS
[FreeTextEntry4] : Status post partial thyroidectomy for nodule. [FreeTextEntry5] : See HPI. [FreeTextEntry6] : History of asthma. [FreeTextEntry7] : History of colonoscopy with polypectomy. [FreeTextEntry9] : Relatively mild arthritis. [de-identified] : Removal of mucoid cyst from right 5th digit.

## 2022-10-28 ENCOUNTER — RESULT REVIEW (OUTPATIENT)
Age: 62
End: 2022-10-28

## 2022-10-30 ENCOUNTER — RESULT REVIEW (OUTPATIENT)
Age: 62
End: 2022-10-30

## 2022-10-31 ENCOUNTER — APPOINTMENT (OUTPATIENT)
Dept: GASTROENTEROLOGY | Facility: HOSPITAL | Age: 62
End: 2022-10-31

## 2023-01-21 ENCOUNTER — APPOINTMENT (OUTPATIENT)
Dept: INTERNAL MEDICINE | Facility: CLINIC | Age: 63
End: 2023-01-21
Payer: COMMERCIAL

## 2023-01-21 VITALS
OXYGEN SATURATION: 99 % | BODY MASS INDEX: 31.47 KG/M2 | DIASTOLIC BLOOD PRESSURE: 80 MMHG | HEIGHT: 55 IN | WEIGHT: 136 LBS | SYSTOLIC BLOOD PRESSURE: 130 MMHG | HEART RATE: 86 BPM

## 2023-01-21 DIAGNOSIS — E55.9 VITAMIN D DEFICIENCY, UNSPECIFIED: ICD-10-CM

## 2023-01-21 DIAGNOSIS — K63.5 POLYP OF COLON: ICD-10-CM

## 2023-01-21 PROCEDURE — 99214 OFFICE O/P EST MOD 30 MIN: CPT

## 2023-01-21 NOTE — PHYSICAL EXAM
[Normal Sclera/Conjunctiva] : normal sclera/conjunctiva [EOMI] : extraocular movements intact [Normal Outer Ear/Nose] : the outer ears and nose were normal in appearance [Normal TMs] : both tympanic membranes were normal [No Lymphadenopathy] : no lymphadenopathy [Supple] : supple [Thyroid Normal, No Nodules] : the thyroid was normal and there were no nodules present [Normal Rate] : normal rate  [Regular Rhythm] : with a regular rhythm [Normal S1, S2] : normal S1 and S2 [No Murmur] : no murmur heard [No Edema] : there was no peripheral edema [No Rash] : no rash [Normal] : affect was normal and insight and judgment were intact

## 2023-01-21 NOTE — HISTORY OF PRESENT ILLNESS
[de-identified] : Patient is a 62F with thyroid nodule (s/p thyroidectomy), prediabetes, HLD, allergy rhinitis with bronchospasm (advair daily), SVT s/p ablation, right 5th digit degenerative cysts presents today in follow up for pre-diabetes, HLD\par \par Dr. Read (eye) exam is up to date\par \par \par Feels well without complaints today

## 2023-01-21 NOTE — HEALTH RISK ASSESSMENT
[Never] : Never [No] : In the past 12 months have you used drugs other than those required for medical reasons? No [No falls in past year] : Patient reported no falls in the past year [0] : 2) Feeling down, depressed, or hopeless: Not at all (0) [PHQ-2 Negative - No further assessment needed] : PHQ-2 Negative - No further assessment needed [Patient/Caregiver not ready to engage] : , patient/caregiver not ready to engage [de-identified] : walking [de-identified] : portion control [[id number]] : 0 [AdvancecareDate] : 6/2022

## 2023-02-02 LAB
25(OH)D3 SERPL-MCNC: 26.3 NG/ML
ALBUMIN SERPL ELPH-MCNC: 4.7 G/DL
ALP BLD-CCNC: 77 U/L
ALT SERPL-CCNC: 33 U/L
ANION GAP SERPL CALC-SCNC: 12 MMOL/L
AST SERPL-CCNC: 23 U/L
BASOPHILS # BLD AUTO: 0.02 K/UL
BASOPHILS NFR BLD AUTO: 0.7 %
BILIRUB SERPL-MCNC: 0.7 MG/DL
BUN SERPL-MCNC: 17 MG/DL
CALCIUM SERPL-MCNC: 9.8 MG/DL
CHLORIDE SERPL-SCNC: 103 MMOL/L
CHOLEST SERPL-MCNC: 228 MG/DL
CO2 SERPL-SCNC: 26 MMOL/L
CREAT SERPL-MCNC: 0.67 MG/DL
EGFR: 99 ML/MIN/1.73M2
EOSINOPHIL # BLD AUTO: 0.19 K/UL
EOSINOPHIL NFR BLD AUTO: 6.3 %
ESTIMATED AVERAGE GLUCOSE: 117 MG/DL
GLUCOSE SERPL-MCNC: 98 MG/DL
HBA1C MFR BLD HPLC: 5.7 %
HCT VFR BLD CALC: 43.3 %
HDLC SERPL-MCNC: 91 MG/DL
HGB BLD-MCNC: 14.3 G/DL
IMM GRANULOCYTES NFR BLD AUTO: 0.3 %
LDLC SERPL CALC-MCNC: 125 MG/DL
LYMPHOCYTES # BLD AUTO: 1.21 K/UL
LYMPHOCYTES NFR BLD AUTO: 40.1 %
MAN DIFF?: NORMAL
MCHC RBC-ENTMCNC: 30 PG
MCHC RBC-ENTMCNC: 33 GM/DL
MCV RBC AUTO: 90.8 FL
MONOCYTES # BLD AUTO: 0.18 K/UL
MONOCYTES NFR BLD AUTO: 6 %
NEUTROPHILS # BLD AUTO: 1.41 K/UL
NEUTROPHILS NFR BLD AUTO: 46.6 %
NONHDLC SERPL-MCNC: 137 MG/DL
PLATELET # BLD AUTO: 194 K/UL
POTASSIUM SERPL-SCNC: 3.9 MMOL/L
PROT SERPL-MCNC: 7.3 G/DL
RBC # BLD: 4.77 M/UL
RBC # FLD: 13.8 %
SODIUM SERPL-SCNC: 141 MMOL/L
T4 FREE SERPL-MCNC: 1.1 NG/DL
TRIGL SERPL-MCNC: 61 MG/DL
TSH SERPL-ACNC: 2.87 UIU/ML
WBC # FLD AUTO: 3.02 K/UL

## 2023-06-05 ENCOUNTER — NON-APPOINTMENT (OUTPATIENT)
Age: 63
End: 2023-06-05

## 2023-06-06 ENCOUNTER — APPOINTMENT (OUTPATIENT)
Dept: INTERNAL MEDICINE | Facility: CLINIC | Age: 63
End: 2023-06-06

## 2023-06-06 ENCOUNTER — NON-APPOINTMENT (OUTPATIENT)
Age: 63
End: 2023-06-06

## 2023-06-06 ENCOUNTER — APPOINTMENT (OUTPATIENT)
Dept: CARDIOLOGY | Facility: CLINIC | Age: 63
End: 2023-06-06
Payer: COMMERCIAL

## 2023-06-06 VITALS
WEIGHT: 132.38 LBS | TEMPERATURE: 97.6 F | BODY MASS INDEX: 30.64 KG/M2 | OXYGEN SATURATION: 98 % | HEIGHT: 55 IN | SYSTOLIC BLOOD PRESSURE: 132 MMHG | DIASTOLIC BLOOD PRESSURE: 86 MMHG | HEART RATE: 78 BPM | RESPIRATION RATE: 16 BRPM

## 2023-06-06 DIAGNOSIS — Z92.89 PERSONAL HISTORY OF OTHER MEDICAL TREATMENT: ICD-10-CM

## 2023-06-06 DIAGNOSIS — J98.01 ACUTE BRONCHOSPASM: ICD-10-CM

## 2023-06-06 PROCEDURE — 99214 OFFICE O/P EST MOD 30 MIN: CPT

## 2023-06-06 PROCEDURE — 36415 COLL VENOUS BLD VENIPUNCTURE: CPT

## 2023-06-06 PROCEDURE — 93000 ELECTROCARDIOGRAM COMPLETE: CPT

## 2023-06-06 NOTE — ASSESSMENT
[FreeTextEntry1] : EKG 6/6/2023.  Sinus rhythm, within normal limits.\par EKG 8/3/2022.  Sinus rhythm, within normal limits.\par EKG 7/21/2021.  Sinus rhythm, within normal limits.\par EKG 7/29/2020.  Sinus rhythm.  Within normal limits.\par EKG 1/30/19. sinus rhythm. Within normal limits.

## 2023-06-06 NOTE — REASON FOR VISIT
[FreeTextEntry3] : Get Katz [FreeTextEntry1] : Ms. DANIELLE LANDAALLA.,  has the following problem list:\par \par Supraventricular tachycardia/status post ablation 2013\par Dyslipidemia\par Prediabetes\par \par She has additional medical problems as noted\par \par Her primary care physician is Doctor Deutsch.\par

## 2023-06-06 NOTE — REVIEW OF SYSTEMS
[Negative] : Musculoskeletal [FreeTextEntry4] : Status post partial thyroidectomy for nodule. [FreeTextEntry5] : See HPI. [FreeTextEntry6] : History of asthma. [FreeTextEntry7] : History of colonoscopy with polypectomy. [FreeTextEntry9] : Knee arthritis. [de-identified] : Removal of mucoid cyst from right 5th digit.

## 2023-06-06 NOTE — CARDIOLOGY SUMMARY
[de-identified] : Holter 05/2012 sinus rhythm. Very rare APC.\par  [de-identified] : Stress Test 03/2013 no ischemia. 7 minutes. Anshul stage III. 8.1 METs. 97%. Diastolic  hypertension.\par  [de-identified] : Echo 02/2013 normal LV systolic and diastolic function, wall thickness, and chamber  size. EF 75%. Trivial anterior/posterior effusion. Normal valves. Trivial MR/TR.\par

## 2023-06-06 NOTE — HISTORY OF PRESENT ILLNESS
[FreeTextEntry1] : This 63 year-old female patient presents for cardiovascular evaluation.\par \par Her problem list is as noted above.\par \par Since her last examination she reports no major events or hospitalizations.  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  She also exercises on her own.  She is working on her diet.\par \par There have been no symptoms of chest discomfort, shortness of breath, palpitation, lightheadedness, fainting.\par \par She is seen at the patient and her primary care physician's request.\par \par

## 2023-06-29 NOTE — ASU PATIENT PROFILE, ADULT - NS PRO LAST MENSTRUAL
-- DO NOT REPLY / DO NOT REPLY ALL --  -- Message is from [facility] [employer] ([facility]) --    General Patient Message:     Patient calling in to correct her previous message as she gave the wrong blood pressures.  Her BP for yesterday was 140/78 with pulse of 56 and this mornings BP was 138/83 with pulse of 55.  Patients oxygen today is 91%.    Please call patient to advise.      Caller Information       Type Contact Phone/Fax    06/29/2023 10:09 AM CDT Phone (Incoming) Zofia Cantrell (Self) [phone] (M)    06/29/2023 11:16 AM CDT Phone (Incoming) Zofia Cantrell (Self) [phone] (M)        Alternative phone number: no    Can a detailed message be left? Yes    Message Turnaround: [place]:    Refer to site's KB page for routing instructions    Please give this turnaround time to the caller:   \"You can expect to receive a response 2-3 business days after your provider's clinical team reviews the message\"               10 yearsago

## 2023-07-25 ENCOUNTER — LABORATORY RESULT (OUTPATIENT)
Age: 63
End: 2023-07-25

## 2023-07-26 ENCOUNTER — NON-APPOINTMENT (OUTPATIENT)
Age: 63
End: 2023-07-26

## 2023-07-26 DIAGNOSIS — Z01.89 ENCOUNTER FOR OTHER SPECIFIED SPECIAL EXAMINATIONS: ICD-10-CM

## 2023-07-26 LAB
T3 SERPL-MCNC: 128 NG/DL
T4 FREE SERPL-MCNC: 1.2 NG/DL
THYROGLOB AB SERPL-ACNC: <20 IU/ML
THYROPEROXIDASE AB SERPL IA-ACNC: 118 IU/ML
TSH SERPL-ACNC: 3.38 UIU/ML

## 2023-07-28 ENCOUNTER — APPOINTMENT (OUTPATIENT)
Dept: INTERNAL MEDICINE | Facility: CLINIC | Age: 63
End: 2023-07-28
Payer: COMMERCIAL

## 2023-07-28 ENCOUNTER — RESULT REVIEW (OUTPATIENT)
Age: 63
End: 2023-07-28

## 2023-07-28 VITALS
HEART RATE: 91 BPM | OXYGEN SATURATION: 97 % | BODY MASS INDEX: 28.69 KG/M2 | SYSTOLIC BLOOD PRESSURE: 145 MMHG | HEIGHT: 57 IN | WEIGHT: 133 LBS | DIASTOLIC BLOOD PRESSURE: 95 MMHG

## 2023-07-28 DIAGNOSIS — Z12.39 ENCOUNTER FOR OTHER SCREENING FOR MALIGNANT NEOPLASM OF BREAST: ICD-10-CM

## 2023-07-28 DIAGNOSIS — Z00.00 ENCOUNTER FOR GENERAL ADULT MEDICAL EXAMINATION W/OUT ABNORMAL FINDINGS: ICD-10-CM

## 2023-07-28 DIAGNOSIS — R92.2 INCONCLUSIVE MAMMOGRAM: ICD-10-CM

## 2023-07-28 PROCEDURE — 99396 PREV VISIT EST AGE 40-64: CPT

## 2023-07-28 RX ORDER — AZELASTINE HYDROCHLORIDE 137 UG/1
137 SPRAY, METERED NASAL TWICE DAILY
Qty: 3 | Refills: 3 | Status: ACTIVE | COMMUNITY
Start: 2022-06-18 | End: 1900-01-01

## 2023-07-28 RX ORDER — FLUTICASONE PROPIONATE 50 UG/1
50 SPRAY, METERED NASAL DAILY
Qty: 3 | Refills: 3 | Status: ACTIVE | COMMUNITY
Start: 1900-01-01 | End: 1900-01-01

## 2023-07-28 RX ORDER — FLUTICASONE PROPION/SALMETEROL 250-50 MCG
250-50 BLISTER, WITH INHALATION DEVICE INHALATION
Refills: 0 | Status: ACTIVE | COMMUNITY

## 2023-07-28 NOTE — HEALTH RISK ASSESSMENT
[No falls in past year] : Patient reported no falls in the past year [0] : 2) Feeling down, depressed, or hopeless: Not at all (0) [PHQ-2 Negative - No further assessment needed] : PHQ-2 Negative - No further assessment needed [Patient reported mammogram was normal] : Patient reported mammogram was normal [Patient declined PAP Smear] : Patient declined PAP Smear [Patient reported colonoscopy was abnormal] : Patient reported colonoscopy was abnormal [Employed] : employed [Single] : single [Fully functional (bathing, dressing, toileting, transferring, walking, feeding)] : Fully functional (bathing, dressing, toileting, transferring, walking, feeding) [Fully functional (using the telephone, shopping, preparing meals, housekeeping, doing laundry, using] : Fully functional and needs no help or supervision to perform IADLs (using the telephone, shopping, preparing meals, housekeeping, doing laundry, using transportation, managing medications and managing finances) [No] : In the past 12 months have you used drugs other than those required for medical reasons? 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

## 2023-07-28 NOTE — HEALTH RISK ASSESSMENT
[No falls in past year] : Patient reported no falls in the past year [0] : 2) Feeling down, depressed, or hopeless: Not at all (0) [PHQ-2 Negative - No further assessment needed] : PHQ-2 Negative - No further assessment needed [Patient reported mammogram was normal] : Patient reported mammogram was normal [Patient declined PAP Smear] : Patient declined PAP Smear [Patient reported colonoscopy was abnormal] : Patient reported colonoscopy was abnormal [Employed] : employed [Single] : single [Fully functional (bathing, dressing, toileting, transferring, walking, feeding)] : Fully functional (bathing, dressing, toileting, transferring, walking, feeding) [Fully functional (using the telephone, shopping, preparing meals, housekeeping, doing laundry, using] : Fully functional and needs no help or supervision to perform IADLs (using the telephone, shopping, preparing meals, housekeeping, doing laundry, using transportation, managing medications and managing finances) [No] : In the past 12 months have you used drugs other than those required for medical reasons? 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

## 2023-07-28 NOTE — HISTORY OF PRESENT ILLNESS
[de-identified] : Patient is a 62F with thyroid nodule (s/p thyroidectomy), prediabetes, HLD, allergy rhinitis with bronchospasm (advair daily), SVT s/p ablation, right 5th digit degenerative cysts presents today for annual exam\par \par Dr. Read (eye) exam is up to date\par \par Elevated A1C - admits to having a sweet tooth\par breakfast: toast (toast), 1 egg boiled, 1 slice of cheese\par lunch: rice (1 cup), meat, veggie\par dinner: avocado shake (oat milk, banana, lemon ice)\par during the day sweet snacks\par \par \par Feels well without complaints today

## 2023-07-28 NOTE — ASSESSMENT
[FreeTextEntry1] : refusing PAP\par rx for mammo given\par declining tdap, shingles vaccine\par colonoscopy due in 10/23

## 2023-07-28 NOTE — HISTORY OF PRESENT ILLNESS
[de-identified] : Patient is a 62F with thyroid nodule (s/p thyroidectomy), prediabetes, HLD, allergy rhinitis with bronchospasm (advair daily), SVT s/p ablation, right 5th digit degenerative cysts presents today for annual exam\par \par Dr. Read (eye) exam is up to date\par \par Elevated A1C - admits to having a sweet tooth\par breakfast: toast (toast), 1 egg boiled, 1 slice of cheese\par lunch: rice (1 cup), meat, veggie\par dinner: avocado shake (oat milk, banana, lemon ice)\par during the day sweet snacks\par \par \par Feels well without complaints today

## 2023-07-31 RX ORDER — ATORVASTATIN CALCIUM 10 MG/1
10 TABLET, FILM COATED ORAL DAILY
Qty: 90 | Refills: 3 | Status: DISCONTINUED | COMMUNITY
Start: 2023-06-06 | End: 2023-07-31

## 2023-08-03 ENCOUNTER — APPOINTMENT (OUTPATIENT)
Dept: SURGERY | Facility: CLINIC | Age: 63
End: 2023-08-03
Payer: COMMERCIAL

## 2023-08-03 DIAGNOSIS — E66.9 OBESITY, UNSPECIFIED: ICD-10-CM

## 2023-08-03 DIAGNOSIS — E04.1 NONTOXIC SINGLE THYROID NODULE: ICD-10-CM

## 2023-08-03 PROCEDURE — 99213 OFFICE O/P EST LOW 20 MIN: CPT

## 2023-08-03 NOTE — PHYSICAL EXAM
[de-identified] : well healed scar [Midline] : located in midline position [Normal] : orientation to person, place, and time: normal

## 2023-08-03 NOTE — HISTORY OF PRESENT ILLNESS
[de-identified] : Pt 2 years s/p thyroid lobectomy for NIFTP doing well on no synhtroid labs and sonogram reviewed and stable

## 2023-08-04 ENCOUNTER — RESULT REVIEW (OUTPATIENT)
Age: 63
End: 2023-08-04

## 2023-09-20 ENCOUNTER — RESULT REVIEW (OUTPATIENT)
Age: 63
End: 2023-09-20

## 2023-09-20 ENCOUNTER — APPOINTMENT (OUTPATIENT)
Dept: HEMATOLOGY ONCOLOGY | Facility: CLINIC | Age: 63
End: 2023-09-20
Payer: COMMERCIAL

## 2023-09-20 VITALS
DIASTOLIC BLOOD PRESSURE: 68 MMHG | OXYGEN SATURATION: 96 % | WEIGHT: 137 LBS | RESPIRATION RATE: 16 BRPM | TEMPERATURE: 98 F | SYSTOLIC BLOOD PRESSURE: 119 MMHG | BODY MASS INDEX: 28.76 KG/M2 | HEIGHT: 58 IN | HEART RATE: 84 BPM

## 2023-09-20 DIAGNOSIS — D70.9 NEUTROPENIA, UNSPECIFIED: ICD-10-CM

## 2023-09-20 PROCEDURE — 36415 COLL VENOUS BLD VENIPUNCTURE: CPT

## 2023-09-20 PROCEDURE — 99205 OFFICE O/P NEW HI 60 MIN: CPT | Mod: 25

## 2023-10-04 ENCOUNTER — APPOINTMENT (OUTPATIENT)
Dept: HEMATOLOGY ONCOLOGY | Facility: CLINIC | Age: 63
End: 2023-10-04
Payer: COMMERCIAL

## 2023-10-04 PROCEDURE — 99213 OFFICE O/P EST LOW 20 MIN: CPT | Mod: 95

## 2023-11-19 ENCOUNTER — RESULT REVIEW (OUTPATIENT)
Age: 63
End: 2023-11-19

## 2023-11-20 ENCOUNTER — APPOINTMENT (OUTPATIENT)
Dept: GASTROENTEROLOGY | Facility: HOSPITAL | Age: 63
End: 2023-11-20

## 2023-12-08 ENCOUNTER — APPOINTMENT (OUTPATIENT)
Dept: INTERNAL MEDICINE | Facility: CLINIC | Age: 63
End: 2023-12-08
Payer: COMMERCIAL

## 2023-12-08 VITALS
OXYGEN SATURATION: 96 % | HEIGHT: 58 IN | DIASTOLIC BLOOD PRESSURE: 80 MMHG | BODY MASS INDEX: 28.76 KG/M2 | WEIGHT: 137 LBS | HEART RATE: 86 BPM | SYSTOLIC BLOOD PRESSURE: 130 MMHG

## 2023-12-08 DIAGNOSIS — Z23 ENCOUNTER FOR IMMUNIZATION: ICD-10-CM

## 2023-12-08 PROCEDURE — 99214 OFFICE O/P EST MOD 30 MIN: CPT | Mod: 25

## 2023-12-08 PROCEDURE — 90686 IIV4 VACC NO PRSV 0.5 ML IM: CPT

## 2023-12-08 PROCEDURE — 90472 IMMUNIZATION ADMIN EACH ADD: CPT

## 2023-12-08 PROCEDURE — 36415 COLL VENOUS BLD VENIPUNCTURE: CPT

## 2023-12-08 PROCEDURE — 90715 TDAP VACCINE 7 YRS/> IM: CPT

## 2023-12-08 PROCEDURE — G0008: CPT | Mod: 59

## 2023-12-08 RX ORDER — ERGOCALCIFEROL 1.25 MG/1
1.25 MG CAPSULE, LIQUID FILLED ORAL
Qty: 12 | Refills: 3 | Status: ACTIVE | COMMUNITY
Start: 2023-01-21 | End: 1900-01-01

## 2023-12-08 RX ORDER — ALBUTEROL SULFATE 90 UG/1
108 (90 BASE) INHALANT RESPIRATORY (INHALATION)
Qty: 3 | Refills: 1 | Status: ACTIVE | COMMUNITY
Start: 2023-12-08 | End: 1900-01-01

## 2023-12-08 RX ORDER — FLUTICASONE PROPIONATE AND SALMETEROL 250; 50 UG/1; UG/1
250-50 POWDER RESPIRATORY (INHALATION) TWICE DAILY
Qty: 3 | Refills: 3 | Status: DISCONTINUED | COMMUNITY
End: 2023-12-08

## 2023-12-09 LAB
CHOLEST SERPL-MCNC: 171 MG/DL
ESTIMATED AVERAGE GLUCOSE: 123 MG/DL
HBA1C MFR BLD HPLC: 5.9 %
HDLC SERPL-MCNC: 80 MG/DL
LDLC SERPL CALC-MCNC: 77 MG/DL
NONHDLC SERPL-MCNC: 91 MG/DL
TRIGL SERPL-MCNC: 75 MG/DL

## 2024-05-22 ENCOUNTER — RESULT REVIEW (OUTPATIENT)
Age: 64
End: 2024-05-22

## 2024-05-22 ENCOUNTER — APPOINTMENT (OUTPATIENT)
Dept: HEMATOLOGY ONCOLOGY | Facility: CLINIC | Age: 64
End: 2024-05-22
Payer: COMMERCIAL

## 2024-05-22 VITALS
BODY MASS INDEX: 28.84 KG/M2 | RESPIRATION RATE: 17 BRPM | TEMPERATURE: 97.7 F | WEIGHT: 137.38 LBS | HEIGHT: 58 IN | OXYGEN SATURATION: 96 % | HEART RATE: 90 BPM | SYSTOLIC BLOOD PRESSURE: 128 MMHG | DIASTOLIC BLOOD PRESSURE: 76 MMHG

## 2024-05-22 DIAGNOSIS — D72.819 DECREASED WHITE BLOOD CELL COUNT, UNSPECIFIED: ICD-10-CM

## 2024-05-22 PROCEDURE — 99213 OFFICE O/P EST LOW 20 MIN: CPT

## 2024-05-22 PROCEDURE — 36415 COLL VENOUS BLD VENIPUNCTURE: CPT

## 2024-05-22 NOTE — PHYSICAL EXAM
[Fully active, able to carry on all pre-disease performance without restriction] : Status 0 - Fully active, able to carry on all pre-disease performance without restriction [de-identified] : visit via telehealth

## 2024-05-22 NOTE — REASON FOR VISIT
[Initial Consultation] : an initial consultation for [Follow-Up Visit] : a follow-up visit for [FreeTextEntry2] : leukopenia

## 2024-05-22 NOTE — CONSULT LETTER
[Dear  ___] : Dear  [unfilled], [Consult Letter:] : I had the pleasure of evaluating your patient, [unfilled]. [Please see my note below.] : Please see my note below. [Consult Closing:] : Thank you very much for allowing me to participate in the care of this patient.  If you have any questions, please do not hesitate to contact me. [Sincerely,] : Sincerely, [DrEligio  ___] : Dr. ADDISON [FreeTextEntry3] : Allie Espinal DO, FACCAROLYNE, FACP Medical Oncology and Hematology [facility]

## 2024-05-22 NOTE — HISTORY OF PRESENT ILLNESS
[de-identified] : Ms. Vallecillo is a 63 year old female with a PMH of HLD, GERD, thyroid nodule s/p hemithyroidectomy, that presents for initial consultation referred by PCP Dr. Deutsch for leukopenia.   1/2023 WBC 3.02 hgb 14.3 plt 194 ANC 1.41  7/2023 WBC 3.30 hgb 13.4 plt 189 ANC 1.31  Denies recent infections, recent antibiotic use. States this is recent diagnosis and has not had issues with leukopenia in past. Denies B symptoms.   [facility] 10/2022 with Dr. 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 Thompson   PMhx  HLD GERD  thyroid nodules   PShx  s/p partial thyroidectomy  eye surgery  hand surgery  hemorroidectomy   Meds  Azelestine  Atorvastatin  Flonase  Advair   Allergies  erythromycin - GI upset   Fam Hx  M cousin breast ca  M  cousin lung ca  M aunt lymphoma  Mother MI, ESRD   Social  never a smoker  no ETOH  currently works as IR RN  [de-identified] : Pt here for follow up Overall feels well. no complaints following up with cardio and her thyroid surgeon over the summer

## 2024-06-12 ENCOUNTER — RX RENEWAL (OUTPATIENT)
Age: 64
End: 2024-06-12

## 2024-06-12 ENCOUNTER — APPOINTMENT (OUTPATIENT)
Dept: CARDIOLOGY | Facility: CLINIC | Age: 64
End: 2024-06-12

## 2024-07-01 ENCOUNTER — NON-APPOINTMENT (OUTPATIENT)
Age: 64
End: 2024-07-01

## 2024-07-02 ENCOUNTER — APPOINTMENT (OUTPATIENT)
Dept: CARDIOLOGY | Facility: CLINIC | Age: 64
End: 2024-07-02
Payer: COMMERCIAL

## 2024-07-02 ENCOUNTER — NON-APPOINTMENT (OUTPATIENT)
Age: 64
End: 2024-07-02

## 2024-07-02 VITALS
BODY MASS INDEX: 28.64 KG/M2 | WEIGHT: 136.44 LBS | HEIGHT: 58 IN | SYSTOLIC BLOOD PRESSURE: 138 MMHG | TEMPERATURE: 97.6 F | OXYGEN SATURATION: 98 % | DIASTOLIC BLOOD PRESSURE: 92 MMHG | RESPIRATION RATE: 18 BRPM | HEART RATE: 86 BPM

## 2024-07-02 DIAGNOSIS — R73.03 PREDIABETES.: ICD-10-CM

## 2024-07-02 DIAGNOSIS — E78.5 HYPERLIPIDEMIA, UNSPECIFIED: ICD-10-CM

## 2024-07-02 DIAGNOSIS — I47.10 SUPRAVENTRICULAR TACHYCARDIA, UNSPECIFIED: ICD-10-CM

## 2024-07-02 PROCEDURE — 99215 OFFICE O/P EST HI 40 MIN: CPT | Mod: 25

## 2024-07-02 PROCEDURE — 93000 ELECTROCARDIOGRAM COMPLETE: CPT

## 2024-07-31 ENCOUNTER — RESULT REVIEW (OUTPATIENT)
Age: 64
End: 2024-07-31

## 2024-08-05 ENCOUNTER — NON-APPOINTMENT (OUTPATIENT)
Age: 64
End: 2024-08-05

## 2024-08-06 ENCOUNTER — NON-APPOINTMENT (OUTPATIENT)
Age: 64
End: 2024-08-06

## 2024-08-08 ENCOUNTER — APPOINTMENT (OUTPATIENT)
Dept: SURGERY | Facility: CLINIC | Age: 64
End: 2024-08-08

## 2024-08-08 PROCEDURE — 99213 OFFICE O/P EST LOW 20 MIN: CPT

## 2024-08-08 NOTE — ASSESSMENT
[FreeTextEntry1] : s/p thyroid lobectomy for NIFTP  lab and sonogram reports discussed with patient f/u 1 year with sono and labs

## 2024-08-08 NOTE — PHYSICAL EXAM
[de-identified] : well healed scar [Midline] : located in midline position [Normal] : orientation to person, place, and time: normal

## 2024-08-08 NOTE — HISTORY OF PRESENT ILLNESS
[de-identified] : Pt 3 years s/p thyroid lobectomy for NIFTP doing well without complaints recent sonogram and labs reviewed

## 2024-08-21 ENCOUNTER — RX RENEWAL (OUTPATIENT)
Age: 64
End: 2024-08-21

## 2024-10-22 ENCOUNTER — APPOINTMENT (OUTPATIENT)
Dept: CARDIOLOGY | Facility: CLINIC | Age: 64
End: 2024-10-22

## 2024-10-22 PROCEDURE — 93306 TTE W/DOPPLER COMPLETE: CPT

## 2024-11-08 ENCOUNTER — APPOINTMENT (OUTPATIENT)
Dept: INTERNAL MEDICINE | Facility: CLINIC | Age: 64
End: 2024-11-08

## 2024-11-08 VITALS
SYSTOLIC BLOOD PRESSURE: 124 MMHG | HEART RATE: 90 BPM | OXYGEN SATURATION: 98 % | DIASTOLIC BLOOD PRESSURE: 80 MMHG | WEIGHT: 136 LBS | BODY MASS INDEX: 28.55 KG/M2 | HEIGHT: 58 IN

## 2024-11-08 DIAGNOSIS — Z23 ENCOUNTER FOR IMMUNIZATION: ICD-10-CM

## 2024-11-08 DIAGNOSIS — E78.5 HYPERLIPIDEMIA, UNSPECIFIED: ICD-10-CM

## 2024-11-08 DIAGNOSIS — E55.9 VITAMIN D DEFICIENCY, UNSPECIFIED: ICD-10-CM

## 2024-11-08 DIAGNOSIS — R20.2 PARESTHESIA OF SKIN: ICD-10-CM

## 2024-11-08 DIAGNOSIS — R73.03 PREDIABETES.: ICD-10-CM

## 2024-11-08 DIAGNOSIS — Z00.00 ENCOUNTER FOR GENERAL ADULT MEDICAL EXAMINATION W/OUT ABNORMAL FINDINGS: ICD-10-CM

## 2024-11-08 PROCEDURE — 99396 PREV VISIT EST AGE 40-64: CPT | Mod: 25

## 2024-11-08 PROCEDURE — 90656 IIV3 VACC NO PRSV 0.5 ML IM: CPT

## 2024-11-08 PROCEDURE — 36415 COLL VENOUS BLD VENIPUNCTURE: CPT

## 2024-11-08 PROCEDURE — G0008: CPT

## 2024-11-08 RX ORDER — METHYLPREDNISOLONE 4 MG/1
4 TABLET ORAL
Qty: 1 | Refills: 0 | Status: ACTIVE | COMMUNITY
Start: 2024-11-08 | End: 1900-01-01

## 2024-11-11 LAB
25(OH)D3 SERPL-MCNC: 49.1 NG/ML
ALBUMIN SERPL ELPH-MCNC: 4.9 G/DL
ALP BLD-CCNC: 96 U/L
ALT SERPL-CCNC: 35 U/L
ANION GAP SERPL CALC-SCNC: 17 MMOL/L
AST SERPL-CCNC: 25 U/L
BILIRUB SERPL-MCNC: 0.5 MG/DL
BUN SERPL-MCNC: 15 MG/DL
CALCIUM SERPL-MCNC: 9.7 MG/DL
CHLORIDE SERPL-SCNC: 103 MMOL/L
CHOLEST SERPL-MCNC: 181 MG/DL
CO2 SERPL-SCNC: 26 MMOL/L
CREAT SERPL-MCNC: 0.67 MG/DL
EGFR: 98 ML/MIN/1.73M2
ESTIMATED AVERAGE GLUCOSE: 126 MG/DL
FERRITIN SERPL-MCNC: 198 NG/ML
FOLATE SERPL-MCNC: 15.9 NG/ML
GLUCOSE SERPL-MCNC: 66 MG/DL
HBA1C MFR BLD HPLC: 6 %
HDLC SERPL-MCNC: 86 MG/DL
IRON SATN MFR SERPL: 17 %
IRON SERPL-MCNC: 61 UG/DL
LDLC SERPL CALC-MCNC: 83 MG/DL
NONHDLC SERPL-MCNC: 95 MG/DL
POTASSIUM SERPL-SCNC: 4.4 MMOL/L
PROT SERPL-MCNC: 7.6 G/DL
SODIUM SERPL-SCNC: 146 MMOL/L
TIBC SERPL-MCNC: 349 UG/DL
TRIGL SERPL-MCNC: 65 MG/DL
UIBC SERPL-MCNC: 288 UG/DL
VIT B12 SERPL-MCNC: 806 PG/ML

## 2024-11-24 PROBLEM — M54.12 CERVICAL RADICULOPATHY, ACUTE: Status: ACTIVE | Noted: 2024-11-24

## 2024-12-11 ENCOUNTER — RX RENEWAL (OUTPATIENT)
Age: 64
End: 2024-12-11

## 2024-12-28 DIAGNOSIS — M54.16 RADICULOPATHY, LUMBAR REGION: ICD-10-CM

## 2024-12-28 DIAGNOSIS — R20.0 ANESTHESIA OF SKIN: ICD-10-CM

## 2025-05-09 ENCOUNTER — APPOINTMENT (OUTPATIENT)
Dept: INTERNAL MEDICINE | Facility: CLINIC | Age: 65
End: 2025-05-09
Payer: COMMERCIAL

## 2025-05-09 VITALS
HEIGHT: 58 IN | HEART RATE: 86 BPM | WEIGHT: 130 LBS | RESPIRATION RATE: 18 BRPM | DIASTOLIC BLOOD PRESSURE: 96 MMHG | OXYGEN SATURATION: 98 % | BODY MASS INDEX: 27.29 KG/M2 | SYSTOLIC BLOOD PRESSURE: 152 MMHG | TEMPERATURE: 97.2 F

## 2025-05-09 DIAGNOSIS — E55.9 VITAMIN D DEFICIENCY, UNSPECIFIED: ICD-10-CM

## 2025-05-09 DIAGNOSIS — R73.03 PREDIABETES.: ICD-10-CM

## 2025-05-09 DIAGNOSIS — E04.1 NONTOXIC SINGLE THYROID NODULE: ICD-10-CM

## 2025-05-09 DIAGNOSIS — E78.5 HYPERLIPIDEMIA, UNSPECIFIED: ICD-10-CM

## 2025-05-09 DIAGNOSIS — J30.9 ALLERGIC RHINITIS, UNSPECIFIED: ICD-10-CM

## 2025-05-09 PROCEDURE — 99214 OFFICE O/P EST MOD 30 MIN: CPT

## 2025-05-09 PROCEDURE — G2211 COMPLEX E/M VISIT ADD ON: CPT | Mod: NC

## 2025-05-09 PROCEDURE — 36415 COLL VENOUS BLD VENIPUNCTURE: CPT

## 2025-05-16 LAB
25(OH)D3 SERPL-MCNC: 45.3 NG/ML
ALBUMIN SERPL ELPH-MCNC: 4.6 G/DL
ALP BLD-CCNC: 91 U/L
ALT SERPL-CCNC: 42 U/L
ANION GAP SERPL CALC-SCNC: 18 MMOL/L
AST SERPL-CCNC: 26 U/L
BASOPHILS # BLD AUTO: 0.03 K/UL
BASOPHILS NFR BLD AUTO: 0.7 %
BILIRUB SERPL-MCNC: 0.6 MG/DL
BUN SERPL-MCNC: 16 MG/DL
CALCIUM SERPL-MCNC: 9.2 MG/DL
CHLORIDE SERPL-SCNC: 106 MMOL/L
CHOLEST SERPL-MCNC: 185 MG/DL
CO2 SERPL-SCNC: 20 MMOL/L
CREAT SERPL-MCNC: 0.63 MG/DL
EGFRCR SERPLBLD CKD-EPI 2021: 100 ML/MIN/1.73M2
EOSINOPHIL # BLD AUTO: 0.17 K/UL
EOSINOPHIL NFR BLD AUTO: 4.2 %
ESTIMATED AVERAGE GLUCOSE: 131 MG/DL
GLUCOSE SERPL-MCNC: 101 MG/DL
HBA1C MFR BLD HPLC: 6.2 %
HCT VFR BLD CALC: 42.4 %
HDLC SERPL-MCNC: 80 MG/DL
HGB BLD-MCNC: 14.1 G/DL
IMM GRANULOCYTES NFR BLD AUTO: 0.2 %
LDLC SERPL-MCNC: 91 MG/DL
LYMPHOCYTES # BLD AUTO: 1.49 K/UL
LYMPHOCYTES NFR BLD AUTO: 36.8 %
MAN DIFF?: NORMAL
MCHC RBC-ENTMCNC: 29.1 PG
MCHC RBC-ENTMCNC: 33.3 G/DL
MCV RBC AUTO: 87.6 FL
MONOCYTES # BLD AUTO: 0.24 K/UL
MONOCYTES NFR BLD AUTO: 5.9 %
NEUTROPHILS # BLD AUTO: 2.11 K/UL
NEUTROPHILS NFR BLD AUTO: 52.2 %
NONHDLC SERPL-MCNC: 105 MG/DL
PLATELET # BLD AUTO: 191 K/UL
POTASSIUM SERPL-SCNC: 3.9 MMOL/L
PROT SERPL-MCNC: 7.5 G/DL
RBC # BLD: 4.84 M/UL
RBC # FLD: 13.6 %
SODIUM SERPL-SCNC: 144 MMOL/L
T4 FREE SERPL-MCNC: 1.2 NG/DL
TRIGL SERPL-MCNC: 76 MG/DL
TSH SERPL-ACNC: 2.96 UIU/ML
WBC # FLD AUTO: 4.05 K/UL

## 2025-05-20 DIAGNOSIS — R73.03 PREDIABETES.: ICD-10-CM

## 2025-05-23 RX ORDER — METFORMIN HYDROCHLORIDE 500 MG/1
500 TABLET, COATED ORAL
Qty: 180 | Refills: 3 | Status: ACTIVE | COMMUNITY
Start: 2025-05-20 | End: 1900-01-01

## 2025-05-28 ENCOUNTER — RESULT REVIEW (OUTPATIENT)
Age: 65
End: 2025-05-28

## 2025-05-28 ENCOUNTER — APPOINTMENT (OUTPATIENT)
Dept: HEMATOLOGY ONCOLOGY | Facility: CLINIC | Age: 65
End: 2025-05-28
Payer: COMMERCIAL

## 2025-05-28 VITALS
OXYGEN SATURATION: 96 % | RESPIRATION RATE: 16 BRPM | BODY MASS INDEX: 28.64 KG/M2 | DIASTOLIC BLOOD PRESSURE: 68 MMHG | SYSTOLIC BLOOD PRESSURE: 122 MMHG | TEMPERATURE: 97.2 F | HEART RATE: 88 BPM | HEIGHT: 58 IN | WEIGHT: 136.44 LBS

## 2025-05-28 DIAGNOSIS — D72.819 DECREASED WHITE BLOOD CELL COUNT, UNSPECIFIED: ICD-10-CM

## 2025-05-28 PROCEDURE — 36415 COLL VENOUS BLD VENIPUNCTURE: CPT

## 2025-05-28 PROCEDURE — 99213 OFFICE O/P EST LOW 20 MIN: CPT

## 2025-05-28 PROCEDURE — G2211 COMPLEX E/M VISIT ADD ON: CPT | Mod: NC

## 2025-07-08 ENCOUNTER — APPOINTMENT (OUTPATIENT)
Dept: CARDIOLOGY | Facility: CLINIC | Age: 65
End: 2025-07-08
Payer: COMMERCIAL

## 2025-07-08 VITALS
HEIGHT: 58 IN | OXYGEN SATURATION: 97 % | HEART RATE: 99 BPM | DIASTOLIC BLOOD PRESSURE: 70 MMHG | BODY MASS INDEX: 28.13 KG/M2 | WEIGHT: 134 LBS | SYSTOLIC BLOOD PRESSURE: 120 MMHG

## 2025-07-08 PROCEDURE — 93000 ELECTROCARDIOGRAM COMPLETE: CPT

## 2025-07-08 PROCEDURE — 99214 OFFICE O/P EST MOD 30 MIN: CPT | Mod: 25

## 2025-07-14 ENCOUNTER — APPOINTMENT (OUTPATIENT)
Dept: GASTROENTEROLOGY | Facility: CLINIC | Age: 65
End: 2025-07-14

## 2025-08-06 ENCOUNTER — RESULT REVIEW (OUTPATIENT)
Age: 65
End: 2025-08-06

## 2025-08-11 ENCOUNTER — NON-APPOINTMENT (OUTPATIENT)
Age: 65
End: 2025-08-11

## 2025-08-19 ENCOUNTER — APPOINTMENT (OUTPATIENT)
Dept: SURGERY | Facility: CLINIC | Age: 65
End: 2025-08-19
Payer: COMMERCIAL

## 2025-08-19 DIAGNOSIS — E04.1 NONTOXIC SINGLE THYROID NODULE: ICD-10-CM

## 2025-08-19 DIAGNOSIS — E04.2 NONTOXIC MULTINODULAR GOITER: ICD-10-CM

## 2025-08-19 DIAGNOSIS — Z86.39 PERSONAL HISTORY OF OTHER ENDOCRINE, NUTRITIONAL AND METABOLIC DISEASE: ICD-10-CM

## 2025-08-19 PROCEDURE — 99213 OFFICE O/P EST LOW 20 MIN: CPT

## 2025-08-19 RX ORDER — METFORMIN HYDROCHLORIDE 500 MG/1
500 TABLET, COATED ORAL
Refills: 0 | Status: ACTIVE | COMMUNITY